# Patient Record
Sex: FEMALE | URBAN - METROPOLITAN AREA
[De-identification: names, ages, dates, MRNs, and addresses within clinical notes are randomized per-mention and may not be internally consistent; named-entity substitution may affect disease eponyms.]

---
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
## 2021-06-01 VITALS — HEIGHT: 60 IN | WEIGHT: 111 LBS | BODY MASS INDEX: 21.79 KG/M2

## 2021-06-16 PROBLEM — Z30.017 NEXPLANON INSERTION: Status: ACTIVE | Noted: 2018-04-19

## 2021-06-17 NOTE — PROGRESS NOTES
"SUBJECTIVE  Princess Brown is a 17 y.o. female here for birth control    Contraception: Desires nexplanon. On OCPs previously. On her menses currently. LMP 4/16/18.     ROS  Complete 10 point review of systems negative except as noted above in HPI    Past Medical History:  Allergies    Past Surgical History:  Negative    Medications:  Flonase prn    Social History:  Denies tobacco, EtOh, drug use    Family History:  No history of cancer, heart disease.    OBJECTIVE  /60  Pulse 87  Temp 98  F (36.7  C) (Oral)   Resp 16  Ht 5' 0.24\" (1.53 m)  Wt 111 lb (50.3 kg)  LMP 04/16/2018 (Exact Date)  SpO2 99%  Breastfeeding? No  BMI 21.51 kg/m2     General: Cooperative, pleasant, in no acute distress  Psych: Normal mood and affect.    LABS & IMAGES   Results for orders placed or performed in visit on 04/19/18   Pregnancy, Urine   Result Value Ref Range    Pregnancy Test, Urine Negative Negative    Specific Gravity, UA 1.015 1.001 - 1.030     PROCEDURE NOTE:  She elected to proceed with the placement after the risks and benefits were discussed. Denies allergy to local anesthesia, keloid formation.    Consent signed and will be scanned in EMR.     After cleansing left (non-dominant) arm above the elbow, 2 mL of 1% Lidocaine was administered along the planned injection site for Nexplanon. Insertion site cleansed with iodine. Nexplanon was then inserted 8cm above the medial epicondyle of the humerus, in the groove between the biceps and the triceps (sulcus bicipitalis medialis). Palpation revealed subdermal placement.     Bleeding was minimal and a pressure dressing was applied with instructions to leave this in place for 24 hours. Pt was instructed to observe for signs/symptoms of any infection (localized tenderness, pain, inflammation) or excessive bruising.  No apparent distress at completion of procedure. No complications.     ASSESSMENT/PLAN:   Princess was seen today for contraception.    Diagnoses and " all orders for this visit:    Contraception management: Plan for nexplanon today  -     Pregnancy, Urine- negative    Nexplanon insertion: See procedure note above. Tolerated well without complication.              Options for treatment and follow-up care were reviewed with the patient. Princess Brown and/or guardian was engaged and actively involved in the decision making process. Princess SARI Brown and/or guardian verbalized understanding of the options discussed and was satisfied with the final plan.      Romi García MD